# Patient Record
(demographics unavailable — no encounter records)

---

## 2025-07-17 NOTE — DISCUSSION/SUMMARY
[Normal Development] : development  [No Elimination Concerns] : elimination [Continue Regimen] : feeding [No Skin Concerns] : skin [Normal Sleep Pattern] : sleep [Excessive Weight Gain] : excessive weight gain [School Readiness] : school readiness [Mental Health] : mental health [Nutrition and Physical Activity] : nutrition and physical activity [Oral Health] : oral health [Safety] : safety [Anticipatory Guidance Given] : Anticipatory guidance addressed as per the history of present illness section [No Vaccines] : no vaccines needed [No Medications] : ~He/She~ is not on any medications [FreeTextEntry1] : Halle is a 6yo F with ASD and obesity presenting for [facility]. Patient is developing well for age and doing well in school with OT, PT, speech therapies in a 1-1 classroom. Patient remains in the 99th%tile for weight, despite parental interventions including portion control, snack control, no sugar in home, activity every day. Further guidance provided on healthy eating habits and physical activity. Refer to cardiology for family history of hypertrophic cardiomyopathy in maternal grandmother. Refer to ophtho for failed vision screening. Anticipatory guidance provided. Summer safety discussed. Will obtain repeat blood work to follow up hyperlipidemia. Return in fall for flu shot and at 5yo [facility].

## 2025-07-17 NOTE — HISTORY OF PRESENT ILLNESS
[Normal] : Normal [In own bed] : In own bed [Fruit] : fruit [Vegetables] : vegetables [Meat] : meat [Grains] : grains [Dairy] : dairy [Playtime (60 min/d)] : Playtime 60 min a day [Appropiate parent-child-sibling interaction] : Appropriate parent-child-sibling interaction [Child Cooperates] : Child cooperates [Parent has appropriate responses to behavior] : Parent has appropriate responses to behavior [In ] : In  [Special Education] : receives special education  [No] : No cigarette smoke exposure [Supervised outdoor play] : Supervised outdoor play [Up to date] : Up to date [Yes] : Patient goes to dentist yearly [Exposure to electronic nicotine delivery system] : No exposure to electronic nicotine delivery system [FreeTextEntry1] : Halle is a 4yo F with ASD and obesity presenting for [facility]. Per mother, no acute concerns today. Patient is doing well in school with services, eating healthy, no elimination concerns, sleeping well, active and social. Denies recent illness, fever, cough, difficulty breathing, rash, n/v/d, travel.

## 2025-07-17 NOTE — DEVELOPMENTAL MILESTONES
[None] : none [Spreads with a knife] : spreads with a knife [Dresses and undresses without help] : dresses and undresses without help [Goes to the bathroom independently] : goes to bathroom independently [Is dry through the day] :  is dry through the day [Plays and interacts with peer] : plays and interacts with peer [Answers "why" questions] : answers "why" questions [Tells a story of 2 sentences or more] : tells a story of 2 sentences or more [Follows directions for 4 individual] : follows directions for 4 individual prepositions [Counts 5 objects] : counts 5 objects [Names 3 or more numbers] : names 3 or more numbers [Names 4 or more letters out of order] : names 4 or more letters out of order [Is beginning to skip] : is beginning to skip [Walks on tiptoes when asked] : walks on tiptoes when asked [Catches a bounced ball with] : catches a bounced ball with 2 hands [Copies a triangle] : copies a triangle [Draws a 6-part person] : draws a 6-part person [Copies first name] : copies first name [Cuts well with scissors] : cuts well with scissors [Writes 2 or more letters] : writes 2 or more letters

## 2025-07-17 NOTE — PHYSICAL EXAM
[Alert] : alert [No Acute Distress] : no acute distress [Playful] : playful [Normocephalic] : normocephalic [Conjunctivae with no discharge] : conjunctivae with no discharge [PERRL] : PERRL [EOMI Bilateral] : EOMI bilateral [Auricles Well Formed] : auricles well formed [Clear Tympanic membranes with present light reflex and bony landmarks] : clear tympanic membranes with present light reflex and bony landmarks [No Discharge] : no discharge [Nares Patent] : nares patent [Pink Nasal Mucosa] : pink nasal mucosa [Palate Intact] : palate intact [Uvula Midline] : uvula midline [Nonerythematous Oropharynx] : nonerythematous oropharynx [No Caries] : no caries [Trachea Midline] : trachea midline [Supple, full passive range of motion] : supple, full passive range of motion [No Palpable Masses] : no palpable masses [Symmetric Chest Rise] : symmetric chest rise [Clear to Auscultation Bilaterally] : clear to auscultation bilaterally [Normoactive Precordium] : normoactive precordium [Regular Rate and Rhythm] : regular rate and rhythm [Normal S1, S2 present] : normal S1, S2 present [No Murmurs] : no murmurs [+2 Femoral Pulses] : +2 femoral pulses [Soft] : soft [NonTender] : non tender [Non Distended] : non distended [Normoactive Bowel Sounds] : normoactive bowel sounds [No Hepatomegaly] : no hepatomegaly [No Splenomegaly] : no splenomegaly [Daniel 1] : Daniel 1 [No Clitoromegaly] : no clitoromegaly [Normal Vagina Introitus] : normal vagina introitus [No Abnormal Lymph Nodes Palpated] : no abnormal lymph nodes palpated [Symmetric Buttocks Creases] : symmetric buttocks creases [Symmetric Hip Rotation] : symmetric hip rotation [No Gait Asymmetry] : no gait asymmetry [No pain or deformities with palpation of bone, muscles, joints] : no pain or deformities with palpation of bone, muscles, joints [Normal Muscle Tone] : normal muscle tone [Straight] : straight [No Rash or Lesions] : no rash or lesions